# Patient Record
Sex: FEMALE | Race: WHITE | Employment: OTHER | URBAN - METROPOLITAN AREA
[De-identification: names, ages, dates, MRNs, and addresses within clinical notes are randomized per-mention and may not be internally consistent; named-entity substitution may affect disease eponyms.]

---

## 2019-06-29 ENCOUNTER — APPOINTMENT (OUTPATIENT)
Dept: CT IMAGING | Age: 69
End: 2019-06-29
Attending: EMERGENCY MEDICINE
Payer: COMMERCIAL

## 2019-06-29 ENCOUNTER — HOSPITAL ENCOUNTER (EMERGENCY)
Age: 69
Discharge: HOME | End: 2019-06-29
Attending: EMERGENCY MEDICINE
Payer: COMMERCIAL

## 2019-06-29 VITALS
SYSTOLIC BLOOD PRESSURE: 127 MMHG | BODY MASS INDEX: 20.73 KG/M2 | HEART RATE: 90 BPM | HEIGHT: 63 IN | TEMPERATURE: 98 F | WEIGHT: 117 LBS | DIASTOLIC BLOOD PRESSURE: 81 MMHG | RESPIRATION RATE: 16 BRPM | OXYGEN SATURATION: 98 %

## 2019-06-29 DIAGNOSIS — S00.03XA CONTUSION OF SCALP, INITIAL ENCOUNTER: Primary | ICD-10-CM

## 2019-06-29 PROCEDURE — 99284 EMERGENCY DEPT VISIT MOD MDM: CPT

## 2019-06-29 PROCEDURE — 70450 CT HEAD/BRAIN W/O DYE: CPT

## 2019-06-29 NOTE — DISCHARGE INSTRUCTIONS
Patient Education     Contusion: Care Instructions  Your Care Instructions  Contusion is the medical term for a bruise. It is the result of a direct blow or an impact, such as a fall. Contusions are common sports injuries. Most people think of a bruise as a black-and-blue spot. This happens when small blood vessels get torn and leak blood under the skin. But bones, muscles, and organs can also get bruised. This may damage deep tissues but not cause a bruise you can see. The doctor will do a physical exam to find the location of your contusion. You may also have tests to make sure you do not have a more serious injury, such as a broken bone or nerve damage. These may include X-rays or other imaging tests like a CT scan or MRI. Deep-tissue contusions may cause pain and swelling. But if there is no serious damage, they will often get better in a few weeks with home treatment. The doctor has checked you carefully, but problems can develop later. If you notice any problems or new symptoms, get medical treatment right away. Follow-up care is a key part of your treatment and safety. Be sure to make and go to all appointments, and call your doctor if you are having problems. It's also a good idea to know your test results and keep a list of the medicines you take. How can you care for yourself at home? · Put ice or a cold pack on the sore area for 10 to 20 minutes at a time to stop swelling. Put a thin cloth between the ice pack and your skin. · Be safe with medicines. Read and follow all instructions on the label. ¨ If the doctor gave you a prescription medicine for pain, take it as prescribed. ¨ If you are not taking a prescription pain medicine, ask your doctor if you can take an over-the-counter medicine. · If you can, prop up the sore area on pillows as much as possible for the next few days. Try to keep the sore area above the level of your heart. When should you call for help?   Call your doctor now or seek immediate medical care if:  · Your pain gets worse. · You have new or worse swelling. · You have tingling, weakness, or numbness in the area near the contusion. · The area near the contusion is cold or pale. Watch closely for changes in your health, and be sure to contact your doctor if:  · You do not get better as expected. Where can you learn more? Go to TowerMetriX.be  Enter [id number] in the search box to learn more about \"Contusion: Care Instructions. \"   © 4546-4408 [employer], [employer]orporated. Care instructions adapted under license by [employer] (which disclaims liability or warranty for this information). This care instruction is for use with your licensed healthcare professional. If you have questions about a medical condition or this instruction, always ask your healthcare professional. [address] any warranty or liability for your use of this information. Content Version: 95.9.575550; Current as of: May 22, 2015           Patient Education        Head Injury: Care Instructions  Your Care Instructions    Most injuries to the head are minor. Bumps, cuts, and scrapes on the head and face usually heal well and can be treated the same as injuries to other parts of the body. Although it's rare, once in a while a more serious problem shows up after you are home. So it's good to be on the lookout for symptoms for a day or two. Follow-up care is a key part of your treatment and safety. Be sure to make and go to all appointments, and call your doctor if you are having problems. It's also a good idea to know your test results and keep a list of the medicines you take. How can you care for yourself at home? · Follow your doctor's instructions. He or she will tell you if you need someone to watch you closely for the next 24 hours or longer. · Take it easy for the next few days or more if you are not feeling well.   · Ask your doctor when it's okay for you to go back to activities like driving a car, riding a bike, or operating machinery. When should you call for help? Call 911 anytime you think you may need emergency care. For example, call if:    · You have a seizure.     · You passed out (lost consciousness).     · You are confused or can't stay awake.    Call your doctor now or seek immediate medical care if:    · You have new or worse vomiting.     · You feel less alert.     · You have new weakness or numbness in any part of your body.    Watch closely for changes in your health, and be sure to contact your doctor if:    · You do not get better as expected.     · You have new symptoms, such as headaches, trouble concentrating, or changes in mood. Where can you learn more? Go to http://hemanth-julian.info/. Enter C337 in the search box to learn more about \"Head Injury: Care Instructions. \"  Current as of: Rekha 3, 2018  Content Version: 11.9  © 1319-3665 [employer]. Care instructions adapted under license by [employer] (which disclaims liability or warranty for this information). If you have questions about a medical condition or this instruction, always ask your healthcare professional. [address] any warranty or liability for your use of this information.

## 2019-06-29 NOTE — ED NOTES
I have reviewed discharge instructions with the spouse. The spouse verbalized understanding. Assisted patient to wheelchair and out of care area.   driving patient back to nursing home

## 2019-06-29 NOTE — ED PROVIDER NOTES
EMERGENCY DEPARTMENT HISTORY AND PHYSICAL EXAM    11:19 AM      Date: 6/29/2019  Patient Name: Eunice Sargent    History of Presenting Illness     Chief Complaint   Patient presents with    Fall         History Provided By: patient    Additional History (Context): Eunice Sargent is a 68 y.o. female presents with from memory unit with advanced Alzheimer's, the patient suffered a fall and has an abrasion to her right forehead    No other complaints of pain or discomfort  History and review of systems limited by patient's very advanced dementia. Celina Mackey PCP: Lilliana Heredia MD    Chief Complaint:   Duration:    Timing:    Location:   Quality:   Severity:   Modifying Factors:   Associated Symptoms:           Past History     Past Medical History:  Past Medical History:   Diagnosis Date    Dementia     Psychiatric disorder        Past Surgical History:  History reviewed. No pertinent surgical history. Family History:  History reviewed. No pertinent family history. Social History:  Social History     Tobacco Use    Smoking status: Never Smoker   Substance Use Topics    Alcohol use: Not Currently    Drug use: Not Currently       Allergies:  No Known Allergies      Review of Systems     Review of Systems   Constitutional: Negative for fever. Gastrointestinal: Negative for diarrhea and vomiting. Physical Exam       Patient Vitals for the past 12 hrs:   Temp Pulse Resp BP SpO2   06/29/19 1115    127/81 98 %   06/29/19 1100    167/88 95 %   06/29/19 1051 98 °F (36.7 °C) 90 16 122/73 100 %       Physical Exam   Constitutional: She appears well-developed and well-nourished. Purposefully moving about in the bed, adjusting her sheets. No acute distress. HENT:   Head: Normocephalic. 1 cm diameter abrasion to the right forehead. No palpable skull deformity. Eyes: Conjunctivae are normal. No scleral icterus. Neck: Normal range of motion. Neck supple. No JVD present.    Cardiovascular: Normal rate, regular rhythm and normal heart sounds. 4 intact extremity pulses   Pulmonary/Chest: Effort normal and breath sounds normal.   Abdominal: Soft. She exhibits no mass. There is no tenderness. Musculoskeletal: Normal range of motion. She exhibits no tenderness. No tenderness of CTL vertebra   Lymphadenopathy:     She has no cervical adenopathy. Neurological: She is alert. She has normal strength. No cranial nerve deficit or sensory deficit. Skin: Skin is warm and dry. Nursing note and vitals reviewed. Diagnostic Study Results   Labs -  No results found for this or any previous visit (from the past 12 hour(s)). Radiologic Studies -   CT HEAD WO CONT   Final Result   IMPRESSION:      1. No CT findings of an acute intracranial abnormality. Please note that   noncontrast head CT may be normal in early acute infarct. 2. Mild left periorbital edema/hematoma. Ct Head Wo Cont    Result Date: 6/29/2019  EXAM: CT Head INDICATION: Unwitnessed ground-level fall abrasions to forehead and above left eye. COMPARISON: None. TECHNIQUE: Axial CT imaging of the head was performed without intravenous contrast. One or more dose reduction techniques were used on this CT: automated exposure control, adjustment of the mAs and/or kVp according to patient size, and iterative reconstruction techniques. The specific techniques used on this CT exam have been documented in the patient's electronic medical record. Digital Imaging and Communications in Medicine (DICOM) format image data are available to nonaffiliated external healthcare facilities or entities on a secure, media free, reciprocally searchable basis with patient authorization for at least a 12-month period after this study. _______________ FINDINGS: BRAIN:   > Brain volume: Moderate diffuse volume loss with accentuated sulcal gyral pattern and sylvian fissures. > White matter: Mild white matter disease. > Infarcts, encephalomalacia: None.    > Parenchymal mass: None.   > Parenchymal hemorrhage: None.   > Midline shift: None.   > Miscellaneous: None. EXTRA-AXIAL SPACES: Unremarkable. No fluid collections. CALVARIUM: Intact. SINUSES, MASTOIDS: Clear. OTHER EXTRACRANIAL: Mild left periorbital edema/hematoma. . _______________     IMPRESSION: 1. No CT findings of an acute intracranial abnormality. Please note that noncontrast head CT may be normal in early acute infarct. 2. Mild left periorbital edema/hematoma. Medications ordered:   Medications - No data to display      Medical Decision Making   Initial Medical Decision Making and DDx: We will get CT scan of the head to rule out intracranial lesion. At this time do not suspect spinal injury, upper or lower extremity injury no hip injury. ED Course: Progress Notes, Reevaluation, and Consults:     12:47 PM CT results reviewed, no alarming findings. Will have her ambulate to ensure her extremities are working well and likely discharge. I am the first provider for this patient. I reviewed the vital signs, available nursing notes, past medical history, past surgical history, family history and social history. Patient Vitals for the past 12 hrs:   Temp Pulse Resp BP SpO2   06/29/19 1115    127/81 98 %   06/29/19 1100    167/88 95 %   06/29/19 1051 98 °F (36.7 °C) 90 16 122/73 100 %       Vital Signs-Reviewed the patient's vital signs. Pulse Oximetry Analysis, Cardiac Monitor, 12 lead ekg:     Interpreted by the EP. Records Reviewed: Nursing notes reviewed (Time of Review: 11:19 AM)    Procedures:   Critical Care Time:   Aspirin: (was aspirin given for stroke?)    Diagnosis     Clinical Impression:   1.  Contusion of scalp, initial encounter        Disposition: Discharged      Follow-up Information     Follow up With Specialties Details Why Contact Info    Miguel Samuel MD Internal Medicine In 2 days  [address]  [address] [facility] [id number] Patient's Medications    No medications on file     _______________________________    Notes:    Yoanna Woodruff, MD using Dragon dictation      _______________________________

## 2019-06-29 NOTE — ED TRIAGE NOTES
Patient arrived via EMS. States patient experienced unwitnessed ground level fall. Patient has abrasion to forehead above left eye.

## 2020-01-19 ENCOUNTER — APPOINTMENT (OUTPATIENT)
Dept: CT IMAGING | Age: 70
End: 2020-01-19
Attending: EMERGENCY MEDICINE
Payer: COMMERCIAL

## 2020-01-19 ENCOUNTER — HOSPITAL ENCOUNTER (EMERGENCY)
Dept: GENERAL RADIOLOGY | Age: 70
Discharge: HOME | End: 2020-01-19
Attending: EMERGENCY MEDICINE
Payer: COMMERCIAL

## 2020-01-19 ENCOUNTER — HOSPITAL ENCOUNTER (EMERGENCY)
Age: 70
Discharge: HOME | End: 2020-01-19
Attending: EMERGENCY MEDICINE | Admitting: EMERGENCY MEDICINE
Payer: COMMERCIAL

## 2020-01-19 VITALS
HEART RATE: 66 BPM | OXYGEN SATURATION: 96 % | TEMPERATURE: 98.2 F | RESPIRATION RATE: 14 BRPM | SYSTOLIC BLOOD PRESSURE: 116 MMHG | DIASTOLIC BLOOD PRESSURE: 73 MMHG

## 2020-01-19 DIAGNOSIS — S80.00XA CONTUSION OF KNEE, UNSPECIFIED LATERALITY, INITIAL ENCOUNTER: ICD-10-CM

## 2020-01-19 DIAGNOSIS — S00.83XA FACIAL CONTUSION, INITIAL ENCOUNTER: ICD-10-CM
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
## 2020-01-19 DIAGNOSIS — T14.8XXA ABRASION: ICD-10-CM

## 2020-01-19 DIAGNOSIS — S63.501A RIGHT WRIST SPRAIN, INITIAL ENCOUNTER: ICD-10-CM

## 2020-01-19 PROCEDURE — 90471 IMMUNIZATION ADMIN: CPT

## 2020-01-19 PROCEDURE — 90715 TDAP VACCINE 7 YRS/> IM: CPT | Performed by: EMERGENCY MEDICINE

## 2020-01-19 PROCEDURE — 74011000250 HC RX REV CODE- 250: Performed by: EMERGENCY MEDICINE

## 2020-01-19 PROCEDURE — 70450 CT HEAD/BRAIN W/O DYE: CPT

## 2020-01-19 PROCEDURE — 73110 X-RAY EXAM OF WRIST: CPT

## 2020-01-19 PROCEDURE — 74011250637 HC RX REV CODE- 250/637: Performed by: EMERGENCY MEDICINE

## 2020-01-19 PROCEDURE — 72125 CT NECK SPINE W/O DYE: CPT

## 2020-01-19 PROCEDURE — 99285 EMERGENCY DEPT VISIT HI MDM: CPT

## 2020-01-19 PROCEDURE — 73560 X-RAY EXAM OF KNEE 1 OR 2: CPT

## 2020-01-19 PROCEDURE — 70486 CT MAXILLOFACIAL W/O DYE: CPT

## 2020-01-19 PROCEDURE — 74011250636 HC RX REV CODE- 250/636: Performed by: EMERGENCY MEDICINE

## 2020-01-19 RX ORDER — BACITRACIN 500 UNIT/G
1 PACKET (EA) TOPICAL
Status: COMPLETED | OUTPATIENT
Start: 2020-01-19 | End: 2020-01-19

## 2020-01-19 RX ORDER — ACETAMINOPHEN 500 MG
1000 TABLET ORAL
Status: COMPLETED | OUTPATIENT
Start: 2020-01-19 | End: 2020-01-19

## 2020-01-19 RX ADMIN — BACITRACIN 1 PACKET: 500 OINTMENT TOPICAL at 19:48

## 2020-01-19 RX ADMIN — TETANUS TOXOID, REDUCED DIPHTHERIA TOXOID AND ACELLULAR PERTUSSIS VACCINE, ADSORBED 0.5 ML: 5; 2.5; 8; 8; 2.5 SUSPENSION INTRAMUSCULAR at 18:21

## 2020-01-19 RX ADMIN — ACETAMINOPHEN 1000 MG: 500 TABLET, FILM COATED ORAL at 19:43

## 2020-01-19 NOTE — ED PROVIDER NOTES
EMERGENCY DEPARTMENT HISTORY AND PHYSICAL EXAM    Date: 1/19/2020  Patient Name: Leopold Push    History of Presenting Illness     Chief Complaint   Patient presents with    Fall    Abrasion         History Provided By: Patient and Patient's       Additional History (Context): Leopold Push is a 68 y.o. female with dementia who presents with having face planted onto a a fellow resident's wheelchair today. Nursing home staff called  for mechanical trip and fall. She scraped her right wrist and is complaining also bilateral knee pain. Denies anticoagulation by her . There was no LOC reported. PCP: Valjean Nyhan, MD        Past History     Past Medical History:  Past Medical History:   Diagnosis Date    Dementia [facility])     Psychiatric disorder        Past Surgical History:  History reviewed. No pertinent surgical history. Family History:  History reviewed. No pertinent family history. Social History:  Social History     Tobacco Use    Smoking status: Never Smoker   Substance Use Topics    Alcohol use: Not Currently    Drug use: Not Currently       Allergies:  No Known Allergies      Review of Systems   Review of Systems   HENT: Negative for facial swelling. Musculoskeletal: Positive for arthralgias. Skin: Positive for wound. Neurological: Negative for syncope, weakness and numbness. All Other Systems Negative  Physical Exam     Vitals:    01/19/20 1724 01/19/20 1813 01/19/20 1827   BP:  124/61    Pulse: 76 75    Resp: 12 13    Temp:   98.2 °F (36.8 °C)   SpO2: 96%       Physical Exam  Vitals signs and nursing note reviewed. Constitutional:       Appearance: She is well-developed. HENT:      Head: Normocephalic. Nose:      Comments: Right epistaxis no longer bleeding. Eyes:      Pupils: Pupils are equal, round, and reactive to light. Comments: No orbital ridge drop-off appreciated. No entrapment. Neck:      Musculoskeletal: No muscular tenderness. Thyroid: No thyromegaly. Vascular: No JVD. Trachea: No tracheal deviation. Cardiovascular:      Rate and Rhythm: Normal rate and regular rhythm. Heart sounds: Normal heart sounds. No murmur. No friction rub. No gallop. Pulmonary:      Effort: Pulmonary effort is normal. No respiratory distress. Breath sounds: Normal breath sounds. No stridor. No wheezing or rales. Chest:      Chest wall: No tenderness. Abdominal:      General: There is no distension. Palpations: Abdomen is soft. There is no mass. Tenderness: There is no tenderness. There is no guarding or rebound. Musculoskeletal:         General: Tenderness and signs of injury present. Comments: Bilateral knee contusions. On the left knee, there is tenderness to the medial anterior joint line surface. There is a visible contusion. No varus valgus stressors or effusion. DP PT pulses palpable. Negative Isabel's. Right knee. There is pain with full extension of the knee. Visible contusion. DP PT pulse palpable. Pain stopped secondary to patient discomfort. Right wrist: 2 abrasions to the distal forearm on the medial margin. Each size approximately 1 cm in length. Radial pulse palpable. Full range of motion of wrist and nontender to palpation. Lymphadenopathy:      Cervical: No cervical adenopathy. Skin:     General: Skin is warm and dry. Coloration: Skin is not pale. Findings: No erythema or rash. Neurological:      Mental Status: She is alert and oriented to person, place, and time. Mental status is at baseline. Psychiatric:         Behavior: Behavior normal.         Thought Content: Thought content normal.          Diagnostic Study Results     Labs -   No results found for this or any previous visit (from the past 12 hour(s)).     Radiologic Studies -   CT HEAD WO CONT    (Results Pending)   CT MAXILLOFACIAL WO CONT    (Results Pending)   XR WRIST RT AP/LAT/OBL MIN 3V    (Results Pending)   CT SPINE CERV WO CONT    (Results Pending)   XR KNEE RT MAX 2 VWS    (Results Pending)   XR KNEE LT MAX 2 VWS    (Results Pending)     CT Results  (Last 48 hours)    None        CXR Results  (Last 48 hours)    None            Medical Decision Making   I am the first provider for this patient. I reviewed the vital signs, available nursing notes, past medical history, past surgical history, family history and social history. Vital Signs-Reviewed the patient's vital signs. Procedures:  Procedures    Provider Notes (Medical Decision Making): CT scans of face and head show no acute process. For her CT spine there is a grade 1 anterolisthesis of C4 on C5 and degenerative changes C5 through C7. But there are no acute fractures. X-ray show nothing acute of her bilateral knees or wrist.  Give her Tylenol before departure and she can be returned to the [facility]. Ace bandage applied by nurse to right wrist; excellent position. N/v intact before and after application. MED RECONCILIATION:  Current Facility-Administered Medications   Medication Dose Route Frequency    acetaminophen (TYLENOL) tablet 1,000 mg  1,000 mg Oral NOW    bacitracin 500 unit/gram packet 1 Packet  1 Packet Topical NOW     No current outpatient medications on file. Disposition:  home    DISCHARGE NOTE:   7:19 PM    Pt has been reexamined. Patient has no new complaints, changes, or physical findings. Care plan outlined and precautions discussed. Results of x-rays, CTs were reviewed with the patient. All medications were reviewed with the patient; will d/c home with tylenol. All of pt's questions and concerns were addressed. Patient was instructed and agrees to follow up with PCP, as well as to return to the ED upon further deterioration. Patient is ready to go home.     Follow-up Information     Follow up With Specialties Details Why Contact Info    Aj Prater MD Internal Medicine Schedule an appointment as soon as possible for a visit in 2 days  [address] [address] [address] [facility] EMERGENCY DEPT Emergency Medicine  If symptoms worsen return immediately [address]  [phone]          There are no discharge medications for this patient. Diagnosis     Clinical Impression:   1. Fall, initial encounter    2. Facial contusion, initial encounter    3. Contusion of knee, unspecified laterality, initial encounter    4. Right wrist sprain, initial encounter    5.  Abrasion

## 2020-01-19 NOTE — ED TRIAGE NOTES
Pt is a resident of the [facility]. Was seen by another resident, tripped and fell striking her nose and forearm on a wheelchair. No LOC.

## 2020-01-20 NOTE — ED NOTES
8:04 PM  01/19/20     Discharge instructions given to spouse (name) with verbalization of understanding. Patient accompanied by spouse, family member. Patient discharged with the following prescriptions none. Patient discharged to home (destination).       Mark Saleh RN

## 2020-01-20 NOTE — DISCHARGE INSTRUCTIONS
Patient Education        Bruises: Care Instructions  Your Care Instructions    Bruises occur when small blood vessels under the skin tear or rupture, most often from a twist, bump, or fall. Blood leaks into tissues under the skin and causes a black-and-blue spot that often turns colors, including purplish black, reddish blue, or yellowish green, as the bruise heals. Bruises hurt, but most are not serious and will go away on their own within 2 to 4 weeks. Sometimes, gravity causes them to spread down the body. A leg bruise usually will take longer to heal than a bruise on the face or arms. Follow-up care is a key part of your treatment and safety. Be sure to make and go to all appointments, and call your doctor if you are having problems. It's also a good idea to know your test results and keep a list of the medicines you take. How can you care for yourself at home? · Take pain medicines exactly as directed. ? If the doctor gave you a prescription medicine for pain, take it as prescribed. ? If you are not taking a prescription pain medicine, ask your doctor if you can take an over-the-counter medicine. · Put ice or a cold pack on the area for 10 to 20 minutes at a time. Put a thin cloth between the ice and your skin. · If you can, prop up the bruised area on pillows as much as possible for the next few days. Try to keep the bruise above the level of your heart. When should you call for help? Call your doctor now or seek immediate medical care if:    · You have signs of infection, such as:  ? Increased pain, swelling, warmth, or redness. ? Red streaks leading from the bruise. ? Pus draining from the bruise. ? A fever.     · You have a bruise on your leg and signs of a blood clot, such as:  ? Increasing redness and swelling along with warmth, tenderness, and pain in the bruised area. ? Pain in your calf, back of the knee, thigh, or groin. ?  Redness and swelling in your leg or groin.     · Your pain gets worse.    Watch closely for changes in your health, and be sure to contact your doctor if:    · You do not get better as expected. Where can you learn more? Go to http://hemanth-julian.info/. Enter [phone] in the search box to learn more about \"Bruises: Care Instructions. \"  Current as of: June 26, 2019  Content Version: 12.2  © 5276-1861 [employer]. Care instructions adapted under license by [employer] (which disclaims liability or warranty for this information). If you have questions about a medical condition or this instruction, always ask your healthcare professional. [place] [address] any warranty or liability for your use of this information. Patient Education        Head Injury: Care Instructions  Your Care Instructions    Most injuries to the head are minor. Bumps, cuts, and scrapes on the head and face usually heal well and can be treated the same as injuries to other parts of the body. Although it's rare, once in a while a more serious problem shows up after you are home. So it's good to be on the lookout for symptoms for a day or two. Follow-up care is a key part of your treatment and safety. Be sure to make and go to all appointments, and call your doctor if you are having problems. It's also a good idea to know your test results and keep a list of the medicines you take. How can you care for yourself at home? · Follow your doctor's instructions. He or she will tell you if you need someone to watch you closely for the next 24 hours or longer. · Take it easy for the next few days or more if you are not feeling well. · Ask your doctor when it's okay for you to go back to activities like driving a car, riding a bike, or operating machinery. When should you call for help? Call 911 anytime you think you may need emergency care.  For example, call if:    · You have a seizure.     · You passed out (lost consciousness).     · You are confused or can't stay awake.    Call your doctor now or seek immediate medical care if:    · You have new or worse vomiting.     · You feel less alert.     · You have new weakness or numbness in any part of your body.    Watch closely for changes in your health, and be sure to contact your doctor if:    · You do not get better as expected.     · You have new symptoms, such as headaches, trouble concentrating, or changes in mood. Where can you learn more? Go to http://hemanth-julian.info/. Enter T691 in the search box to learn more about \"Head Injury: Care Instructions. \"  Current as of: March 28, 2019  Content Version: 12.2  © 1229-6513 "[employer]". Care instructions adapted under license by [employer] (which disclaims liability or warranty for this information). If you have questions about a medical condition or this instruction, always ask your healthcare professional. [place] [address] any warranty or liability for your use of this information. Patient Education        Hand Sprain: Care Instructions  Your Care Instructions  A hand sprain occurs when you stretch or tear a ligament in your hand. Ligaments are the tough tissues that connect one bone to another. Most hand sprains will heal with treatment you can do at home. Follow-up care is a key part of your treatment and safety. Be sure to make and go to all appointments, and call your doctor if you are having problems. It's also a good idea to know your test results and keep a list of the medicines you take. How can you care for yourself at home? · If your doctor gave you a splint or immobilizer, wear it as directed. This will help keep swelling down and help your hand heal.  · Follow your doctor's directions for exercise and other activity. · For the first 2 days after your injury, avoid things that might increase swelling, such as hot showers, hot tubs, or hot packs.   · Put ice or a cold pack on your hand for 10 to 20 minutes at a time to stop swelling. Try this every 1 to 2 hours for 3 days (when you are awake) or until the swelling goes down. Put a thin cloth between the ice pack and your skin. Keep your splint dry. · After 2 or 3 days, if your swelling is gone, put a heating pad (set on low) or a warm cloth on your hand. Some experts suggest that you go back and forth between hot and cold treatments. · Prop up your hand on a pillow when you ice it or anytime you sit or lie down. Try to keep it above the level of your heart. This will help reduce swelling. · Take pain medicines exactly as directed. ? If the doctor gave you a prescription medicine for pain, take it as prescribed. ? If you are not taking a prescription pain medicine, ask your doctor if you can take an over-the-counter medicine. · Return to your usual level of activity slowly. When should you call for help? Call your doctor now or seek immediate medical care if:    · Your pain is worse.     · You have new or increased swelling in your hand.     · You cannot move your hand.     · You have tingling, weakness, or numbness in your hand or fingers.     · Your hand or fingers are cool or pale or change color.     · You have a fever.     · Your hand or fingers are red.    Watch closely for changes in your health, and be sure to contact your doctor if:    · Your hand does not get better as expected. Where can you learn more? Go to http://hemanth-julian.info/. Enter Z784 in the search box to learn more about \"Hand Sprain: Care Instructions. \"  Current as of: June 26, 2019  Content Version: 12.2  © 8080-6180 [employer]. Care instructions adapted under license by [facility] (which disclaims liability or warranty for this information).  If you have questions about a medical condition or this instruction, always ask your healthcare professional. Mark Antunez any warranty or liability for your use of this information.

## 2020-04-23 ENCOUNTER — APPOINTMENT (OUTPATIENT)
Dept: CT IMAGING | Age: 70
End: 2020-04-23
Attending: EMERGENCY MEDICINE
Payer: COMMERCIAL

## 2020-04-23 ENCOUNTER — HOSPITAL ENCOUNTER (EMERGENCY)
Age: 70
Discharge: HOME | End: 2020-04-23
Attending: EMERGENCY MEDICINE
Payer: COMMERCIAL

## 2020-04-23 VITALS
WEIGHT: 124 LBS | DIASTOLIC BLOOD PRESSURE: 67 MMHG | SYSTOLIC BLOOD PRESSURE: 132 MMHG | TEMPERATURE: 97.5 F | OXYGEN SATURATION: 99 % | HEART RATE: 71 BPM | RESPIRATION RATE: 16 BRPM | BODY MASS INDEX: 19.93 KG/M2 | HEIGHT: 66 IN

## 2020-04-23 DIAGNOSIS — S09.90XA INJURY OF HEAD, INITIAL ENCOUNTER: Primary | ICD-10-CM

## 2020-04-23 PROCEDURE — 99284 EMERGENCY DEPT VISIT MOD MDM: CPT

## 2020-04-23 PROCEDURE — 70450 CT HEAD/BRAIN W/O DYE: CPT

## 2020-04-23 PROCEDURE — 75810000293 HC SIMP/SUPERF WND  RPR

## 2020-04-23 PROCEDURE — 72125 CT NECK SPINE W/O DYE: CPT

## 2020-04-23 PROCEDURE — 74011000250 HC RX REV CODE- 250: Performed by: EMERGENCY MEDICINE

## 2020-04-23 RX ORDER — TRAZODONE HYDROCHLORIDE 50 MG/1
TABLET ORAL
COMMUNITY

## 2020-04-23 RX ORDER — ACETAMINOPHEN 325 MG/1
325 TABLET ORAL
COMMUNITY

## 2020-04-23 RX ORDER — METRONIDAZOLE 7.5 MG/G
1 GEL TOPICAL 2 TIMES DAILY
COMMUNITY

## 2020-04-23 RX ORDER — RIVASTIGMINE 9.5 MG/24H
1 PATCH, EXTENDED RELEASE TRANSDERMAL DAILY
COMMUNITY

## 2020-04-23 RX ORDER — OLANZAPINE 2.5 MG/1
2.5 TABLET ORAL 2 TIMES DAILY
COMMUNITY

## 2020-04-23 RX ORDER — LANOLIN ALCOHOL/MO/W.PET/CERES
3 CREAM (GRAM) TOPICAL
COMMUNITY

## 2020-04-23 RX ORDER — LIDOCAINE AND PRILOCAINE 25; 25 MG/G; MG/G
CREAM TOPICAL AS NEEDED
Status: DISCONTINUED | OUTPATIENT
Start: 2020-04-23 | End: 2020-04-23 | Stop reason: HOSPADM

## 2020-04-23 RX ORDER — OLANZAPINE 10 MG/1
10 TABLET ORAL
COMMUNITY

## 2020-04-23 RX ORDER — MEMANTINE HYDROCHLORIDE 10 MG/1
10 TABLET ORAL DAILY
COMMUNITY

## 2020-04-23 RX ADMIN — LIDOCAINE AND PRILOCAINE: 25; 25 CREAM TOPICAL at 09:13

## 2020-04-23 NOTE — ED NOTES
Spoke to  Giuseppe Paiztrixie who stated he regularly transports her from hospital and is coming to pick her up now.

## 2020-04-23 NOTE — ED PROVIDER NOTES
EMERGENCY DEPARTMENT HISTORY AND PHYSICAL EXAM    8:50 AM      Date: 4/23/2020  Patient Name: Miguel Cuba    History of Presenting Illness     Chief Complaint   Patient presents with    Fall    Laceration         History Provided By: patient    Additional History (Context): Miguel Cuba is a 65 y.o. female presents with with progressed dementia, had a fall at the nursing home, sustained a laceration to the back of her head. From the electronic medical record her tetanus is up-to-date as of January of this year. She is not complaining of any pain or discomfort and is at her mental baseline per staff from the facility. History and review of systems complicated by nonverbal status due to dementia. PCP: Amada Mei MD    Chief Complaint:   Duration:    Timing:    Location:   Quality:   Severity:   Modifying Factors:   Associated Symptoms:           Past History     Past Medical History:  Past Medical History:   Diagnosis Date    Dementia ([facility] [address]     Psychiatric disorder        Past Surgical History:  No past surgical history on file. Family History:  No family history on file. Social History:  Social History     Tobacco Use    Smoking status: Never Smoker   Substance Use Topics    Alcohol use: Not Currently    Drug use: Not Currently       Allergies:  No Known Allergies      Review of Systems     Review of Systems      Physical Exam       Patient Vitals for the past 12 hrs:   Temp Pulse Resp BP SpO2   04/23/20 0900    142/75 99 %   04/23/20 0857 97.5 °F (36.4 °C) 71 16 145/81 99 %       Physical Exam  Vitals signs and nursing note reviewed. Constitutional:       General: She is not in acute distress. Appearance: She is well-developed. She is not ill-appearing or toxic-appearing. HENT:      Head: Normocephalic. Comments: 1 inch diameter area of swelling with a 2 cm laceration on the right occiput without palpable skull skull deformity  Eyes:      General: No scleral icterus. Extraocular Movements: Extraocular movements intact. Conjunctiva/sclera: Conjunctivae normal.      Pupils: Pupils are equal, round, and reactive to light. Neck:      Musculoskeletal: Normal range of motion and neck supple. No muscular tenderness. Vascular: No JVD. Cardiovascular:      Rate and Rhythm: Normal rate and regular rhythm. Pulmonary:      Effort: Pulmonary effort is normal. No respiratory distress. Musculoskeletal: Normal range of motion. Skin:     General: Skin is warm and dry. Neurological:      General: No focal deficit present. Mental Status: She is alert. Mental status is at baseline. Psychiatric:         Thought Content: Thought content normal.         Judgment: Judgment normal.             Diagnostic Study Results   Labs -  No results found for this or any previous visit (from the past 12 hour(s)). Radiologic Studies -   CT SPINE CERV WO CONT   Final Result   IMPRESSION:      1. No acute fracture or subluxation of the cervical spine. 2.  Multilevel degenerative changes, as described. Please note that this cervical spine CT was performed supine and does not   evaluate for ligamentous injury or instability. If the patient has persistent   symptoms or if otherwise clinically indicated, erect cervical spine plain films   are recommended. CT HEAD WO CONT   Final Result   IMPRESSION:      1. No CT evidence of an acute intracranial abnormality or other findings   related to recent trauma. 2.  Moderate cerebral atrophy/volume loss and periventricular white matter   changes, most commonly seen with chronic microvascular disease. Ct Head Wo Cont    Result Date: 4/23/2020  EXAM: CT HEAD, WITHOUT IV CONTRAST INDICATION: Recent fall with head pain and swelling COMPARISON: 1/19/2020 TECHNIQUE: Multiple axial CT images of the head were obtained extending from the skull base through the vertex.  Additional coronal and sagittal reformations were also performed. One or more dose reduction techniques were used on this CT: automated exposure control, adjustment of the mAs and/or kVp according to patient size, and iterative reconstruction techniques. The specific techniques used on this CT exam have been documented in the patient's electronic medical record. Digital Imaging and Communications in Medicine (DICOM) format image data are available to nonaffiliated external healthcare facilities or entities on a secure, media free, reciprocally searchable basis with patient authorization for at least a 12-month period after this study. _______________ FINDINGS: BRAIN AND POSTERIOR FOSSA: There is generalized prominence of the ventricular system, associated with proportional widening of the cortical cerebral sulci, compatible with generalized volume loss. Hazy hypoattenuation identified along the periventricular white matter, a nonspecific finding which is most commonly encountered in the setting of chronic microvascular disease. There is no intracranial hemorrhage, mass effect, or midline shift. There are no significant additional areas of abnormal parenchymal attenuation. EXTRA-AXIAL SPACES AND MENINGES: There are no abnormal extra-axial fluid collections. CALVARIUM: No acute osseous abnormality. OTHER: The visualized portions of the paranasal sinuses and mastoid air cells are clear. _______________     IMPRESSION: 1. No CT evidence of an acute intracranial abnormality or other findings related to recent trauma. 2.  Moderate cerebral atrophy/volume loss and periventricular white matter changes, most commonly seen with chronic microvascular disease. Ct Spine Cerv Wo Cont    Result Date: 4/23/2020  EXAM: CT SPINE CERV WO CONT CLINICAL INDICATION/HISTORY: Recent fall with neck pain COMPARISON: 1/19/2020 TECHNIQUE: CT of the cervical spine without intravenous contrast administration. Coronal and sagittal reformats were generated and reviewed.   One or more dose reduction techniques were used on this CT: automated exposure control, adjustment of the mAs and/or kVp according to patient size, and iterative reconstruction techniques. The specific techniques used on this CT exam have been documented in the patient's electronic medical record. Digital Imaging and Communications in Medicine (DICOM) format image data are available to nonaffiliated external healthcare facilities or entities on a secure, media free, reciprocally searchable basis with patient authorization for at least a 12-month period after this study. _______________ FINDINGS: VERTEBRAE AND DISCS: There is overall straightening of the normal cervical lordosis. C4-C5 anterolisthesis measures 3 mm. No additional listhesis elsewhere. .  Multilevel degenerative disc space height loss and discogenic endplate spurring, most prominent C5-C7. Vertebral body heights are preserved. Moderate degenerative changes of the atlanto-axial articulation. The posterior elements are intact. No fracture or subluxation. Coronal reformations show normal alignment of articular pillars. There are no significant areas of bone lucency or sclerosis. SPINAL CANAL AND FORAMINA: No high-grade canal stenosis. Degenerative facet arthropathy and uncovertebral proliferative changes with associated foraminal stenoses: Myeloma left at C3-C4, moderately severe on the right at C4-C5, moderately severe on the left and moderate on the right at C5-C6, moderate on the right at C6-C7. PREVERTEBRAL SOFT TISSUES: Normal. VISIBLE PORTIONS OF POSTERIOR FOSSA/BRAIN: Normal. LUNG APICES: Clear. OTHER: None. _______________     IMPRESSION: 1. No acute fracture or subluxation of the cervical spine. 2.  Multilevel degenerative changes, as described. Please note that this cervical spine CT was performed supine and does not evaluate for ligamentous injury or instability.   If the patient has persistent symptoms or if otherwise clinically indicated, erect cervical spine plain films are recommended. Medications ordered:   Medications   lidocaine-prilocaine (EMLA) 2.5-2.5 % cream ( Topical Given 4/23/20 0913)         Medical Decision Making   Initial Medical Decision Making and DDx:  Tetanus is up-to-date, will image head and cervical spine for cervical spine fracture or skull fracture or intracranial injury. Do not think labs will change the course of care. Will repair the laceration. ED Course: Progress Notes, Reevaluation, and Consults:     10:34 AM Pt reevaluated at this time. Discussed results and findings, as well as, diagnosis and plan for discharge. Follow up with doctors/services listed. Return to the emergency department for alarming symptoms. Pt verbalizes understanding and agreement with plan. All questions addressed. Laceration repaired, no intracranial or spinal injury    Wound Repair  Date/Time: 4/23/2020 10:34 AM  Performed by: attendingProcedure prep: Wound cleaned with sterile water. Location details: scalp  Wound length:2.5 cm or less    Anesthesia:  Local Anesthetic: lidocaine/prilocaine emulsion  Skin closure: staples  Number of sutures: 2  Technique: simple  Approximation: loose  Patient tolerance: Patient tolerated the procedure well with no immediate complications  My total time at bedside, performing this procedure was 1-15 minutes. I am the first provider for this patient. I reviewed the vital signs, available nursing notes, past medical history, past surgical history, family history and social history. Patient Vitals for the past 12 hrs:   Temp Pulse Resp BP SpO2   04/23/20 0900    142/75 99 %   04/23/20 0857 97.5 °F (36.4 °C) 71 16 145/81 99 %       Vital Signs-Reviewed the patient's vital signs. Pulse Oximetry Analysis, Cardiac Monitor, 12 lead ekg:      Interpreted by the EP.       Records Reviewed: Nursing notes reviewed (Time of Review: 8:50 AM)    Procedures:   Critical Care Time:   Aspirin: (was aspirin given for stroke?)    Diagnosis     Clinical Impression:   1. Injury of head, initial encounter        Disposition: Discharged      Follow-up Information     Follow up With Specialties Details Why Contact Info    Erika Carrera MD Internal Medicine In 1 week  [address] [place] [address]  [phone]             Patient's Medications   Start Taking    No medications on file   Continue Taking    ACETAMINOPHEN (TYLENOL) 325 MG TABLET    Take 325 mg by mouth every four (4) hours as needed for Pain. MELATONIN 3 MG TABLET    Take 3 mg by mouth nightly. MEMANTINE (NAMENDA) 10 MG TABLET    Take 10 mg by mouth daily. METRONIDAZOLE (METROGEL) 0.75 % TOPICAL GEL    Apply 1 g to affected area two (2) times a day. OLANZAPINE (ZYPREXA) 10 MG TABLET    Take 10 mg by mouth nightly. OLANZAPINE (ZYPREXA) 2.5 MG TABLET    Take 2.5 mg by mouth two (2) times a day. RIVASTIGMINE (EXELON) 9.5 MG/24 HR PATCH    1 Patch by TransDERmal route daily. Dose 13.3 mg/24 hr not found in system    TRAZODONE (DESYREL) 50 MG TABLET    Take  by mouth nightly.    These Medications have changed    No medications on file   Stop Taking    No medications on file     _______________________________    Notes:    Alan Valles MD using Dragon dictation      _______________________________

## 2020-04-23 NOTE — ED NOTES
Spoke to patient's  on phone, provided update, ID verified with security upon patient arrival to ED.

## 2020-04-23 NOTE — ED TRIAGE NOTES
Patient arrives from [facility] for fall witnessed by fellow patient with small laceration to back of head.

## 2020-04-23 NOTE — DISCHARGE INSTRUCTIONS
Patient Education   Laceration to scalp has 2 staples, these should be removed in 10 to 14 days. Learning About a Closed Head Injury  What is a closed head injury? A closed head injury happens when your head gets hit hard. The strong force of the blow causes your brain to shake in your skull. This movement can cause the brain to bruise, swell, or tear. Sometimes nerves or blood vessels also get damaged. This can cause bleeding in or around the brain. A concussion is a type of closed head injury. What are the symptoms? If you have a mild concussion, you may have a mild headache or feel \"not quite right. \" These symptoms are common. They usually go away over a few days to 4 weeks. But sometimes after a concussion, you feel like you can't function as well as before the injury. And you have new symptoms. This is called postconcussive syndrome. You may:  · Find it harder to solve problems, think, concentrate, or remember. · Have headaches. · Have changes in your sleep patterns, such as not being able to sleep or sleeping all the time. · Have changes in your personality. · Not be interested in your usual activities. · Feel angry or anxious without a clear reason. · Lose your sense of taste or smell. · Be dizzy, lightheaded, or unsteady. It may be hard to stand or walk. How is a closed head injury treated? Any person who may have a concussion needs to see a doctor. Some people have to stay in the hospital to be watched. Others can go home safely. If you go home, follow your doctor's instructions. He or she will tell you if you need someone to watch you closely for the next 24 hours or longer. Rest is the best treatment. Get plenty of sleep at night. And try to rest during the day. · Avoid activities that are physically or mentally demanding. These include housework, exercise, and schoolwork. And don't play video games, send text messages, or use the computer.  You may need to change your school or work schedule to be able to avoid these activities. · Ask your doctor when it's okay to drive, ride a bike, or operate machinery. · Take an over-the-counter pain medicine, such as acetaminophen (Tylenol), ibuprofen (Advil, Motrin), or naproxen (Aleve). Be safe with medicines. Read and follow all instructions on the label. · Check with your doctor before you use any other medicines for pain. · Do not drink alcohol or use illegal drugs. They can slow recovery. They can also increase your risk of getting a second head injury. Follow-up care is a key part of your treatment and safety. Be sure to make and go to all appointments, and call your doctor if you are having problems. It's also a good idea to know your test results and keep a list of the medicines you take. Where can you learn more? Go to http://hemanth-julian.info/  Enter E235 in the search box to learn more about \"Learning About a Closed Head Injury. \"  Current as of: November 19, 2019Content Version: 12.4  © 7831-3286 [employer]. Care instructions adapted under license by [employer] (which disclaims liability or warranty for this information). If you have questions about a medical condition or this instruction, always ask your healthcare professional. [address] any warranty or liability for your use of this information.

## 2020-10-18 ENCOUNTER — HOSPITAL ENCOUNTER (EMERGENCY)
Age: 70
Discharge: HOME | End: 2020-10-18
Attending: EMERGENCY MEDICINE
Payer: COMMERCIAL

## 2020-10-18 VITALS
SYSTOLIC BLOOD PRESSURE: 160 MMHG | OXYGEN SATURATION: 93 % | DIASTOLIC BLOOD PRESSURE: 72 MMHG | TEMPERATURE: 98.1 F | HEART RATE: 75 BPM | RESPIRATION RATE: 18 BRPM
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
## 2020-10-18 DIAGNOSIS — F03.C0 ADVANCED DEMENTIA: ICD-10-CM

## 2020-10-18 PROCEDURE — 99284 EMERGENCY DEPT VISIT MOD MDM: CPT

## 2020-10-18 NOTE — DISCHARGE INSTRUCTIONS
Patient Education        Dementia: Care Instructions  Your Care Instructions     Dementia is a loss of mental skills that affects your daily life. It is different than the occasional trouble with memory that is part of aging. You may find it hard to remember things that you feel you should be able to remember. Or you may feel that your mind is just not working as well as usual.  Finding out that you have dementia is a shock. You may be afraid and worried about how the condition will change your life. Although there is no cure at this time, medicine may slow memory loss and improve thinking for a while. Other medicines may be able to help you sleep or cope with depression and behavior changes. Dementia often gets worse slowly. But it can get worse quickly. As dementia gets worse, it may become harder to do common things that take planning, like making a list and going shopping. Over time, the disease may make it hard for you to take care of yourself. Some people with dementia need others to help care for them. Dementia is different for everyone. You may be able to function well for a long time. In the early stage of the condition, you can do things at home to make life easier and safer. You also can keep doing your hobbies and other activities. Many people find comfort in planning now for their future needs. Follow-up care is a key part of your treatment and safety. Be sure to make and go to all appointments, and call your doctor if you are having problems. It's also a good idea to know your test results and keep a list of the medicines you take. How can you care for yourself at home? · Take your medicines exactly as prescribed. Call your doctor if you think you are having a problem with your medicine. · Eat healthy foods. Eat lots of whole grains, fruits, and vegetables every day.  If you are not hungry, try snacks or nutritional drinks such as Boost, Ensure, or Sustacal.  · If you have problems sleeping:  ? Try not to nap too close to your bedtime. ? Exercise regularly. Walking is a good choice. ? Try a glass of warm milk or caffeine-free herbal tea before bed. · Do tasks and activities during the time of day when you feel your best. It may help to develop a daily routine. · Post labels, lists, and sticky notes to help you remember things. Write your activities on a calendar you can easily find. Put your clock where you can easily see it. · Stay active. Take walks in familiar places, or with friends or loved ones. Try to stay active mentally too. Read and work crossword puzzles if you enjoy these activities. · Do not drive unless you can pass an on-road driving test. If you are not sure if you are safe to drive, your state 's license bureau can test you. · Keep a cordless phone and a flashlight with new batteries by your bed. If possible, put a phone in each of the main rooms of your house, or carry a cell phone in case you fall and cannot reach a phone. Or, you can wear a device around your neck or wrist. You push a button that sends a signal for help. Acknowledge your emotions and plan for the future  · Talk openly and honestly with your doctor. · Let yourself grieve. It is common to feel angry, scared, frustrated, anxious, or depressed. · Get emotional support from family, friends, a support group, or a counselor experienced in working with people who have dementia. · Ask for help if you need it. · Tell your doctor how you feel. You may feel upset, angry, or worried at times. Many things can cause this, including poor sleep, medicine side effects, confusion, and pain. Your doctor may be able to help you. · Plan for the future. ? Talk to your family and doctor about preparing a living will and other important papers while you can make decisions. These papers tell your doctors how to care for you at the end of your life.   ? Consider naming a person to make decisions about your care if you are not able to. When should you call for help? Call 911 anytime you think you may need emergency care. For example, call if:    · You are lost and do not know whom to call.     · You are injured and do not know whom to call. Call your doctor now or seek immediate medical care if:    · You are more confused or upset than usual.     · You feel like you could hurt yourself because your mind is not working well. Watch closely for changes in your health, and be sure to contact your doctor if you have any problems. Where can you learn more? Go to http://www.gray.com/  Enter M476 in the search box to learn more about \"Dementia: Care Instructions. \"  Current as of: January 31, 2020               Content Version: 12.6  © 4350-3513 [employer]. Care instructions adapted under license by [employer] (which disclaims liability or warranty for this information). If you have questions about a medical condition or this instruction, always ask your healthcare professional. [address] any warranty or liability for your use of this information.

## 2020-10-18 NOTE — ED TRIAGE NOTES
Pt arrived via EMS for a reported \"ground level fall\". Per EMS patient comes from [facility] and had a reported fall from standing position. Per EMS patient has a history of dementia and only answers questions with one word which staff at [place] states this is her normal mentation. Pt denies any pains and no obvious injury noted.

## 2020-10-18 NOTE — ED PROVIDER NOTES
EMERGENCY DEPARTMENT HISTORY AND PHYSICAL EXAM    4:45 PM      Date: 10/18/2020  Patient Name: Zelalem Gordillo    History of Presenting Illness     Chief Complaint   Patient presents with    Fall         History Provided By: EMS    Additional History (Context): Zelalem Gordillo is a 65 y.o. female presents with patient with advanced dementia comes from memory care unit where she had an unwitnessed fall. Paramedics found her supine on the ground with a pillow behind her bed and staff attending to her. No obvious injury, patient not complaining of any pain. She is mostly nonverbal gives one-word answers to some questions. She is at her normal baseline. She is alert sometimes regard to her surroundings but mostly just stares ahead. History and review of systems limited by severe advanced dementia. PCP: Olga Joyce MD    Chief Complaint:   Duration:    Timing:    Location:   Quality:   Severity:   Modifying Factors:   Associated Symptoms:       Current Outpatient Medications   Medication Sig Dispense Refill    rivastigmine (EXELON) 9.5 mg/24 hr patch 1 Patch by TransDERmal route daily. Dose 13.3 mg/24 hr not found in system      acetaminophen (TYLENOL) 325 mg tablet Take 325 mg by mouth every four (4) hours as needed for Pain.  melatonin 3 mg tablet Take 3 mg by mouth nightly.  metroNIDAZOLE (METROGEL) 0.75 % topical gel Apply 1 g to affected area two (2) times a day.  memantine (NAMENDA) 10 mg tablet Take 10 mg by mouth daily.  OLANZapine (ZyPREXA) 2.5 mg tablet Take 2.5 mg by mouth two (2) times a day.  OLANZapine (ZyPREXA) 10 mg tablet Take 10 mg by mouth nightly.  traZODone (DESYREL) 50 mg tablet Take  by mouth nightly. Past History     Past Medical History:  Past Medical History:   Diagnosis Date    Dementia ([facility] [address]     Psychiatric disorder        Past Surgical History:  No past surgical history on file. Family History:  No family history on file.     Social History:  Social History     Tobacco Use    Smoking status: Never Smoker   Substance Use Topics    Alcohol use: Not Currently    Drug use: Not Currently       Allergies:  No Known Allergies      Review of Systems     Review of Systems      Physical Exam       Patient Vitals for the past 12 hrs:   Temp Pulse Resp BP SpO2   10/18/20 1652 98.1 °F (36.7 °C) 75 18 (!) 160/72 93 %       Physical Exam  Vitals signs and nursing note reviewed. Constitutional:       General: She is not in acute distress. Appearance: Normal appearance. She is well-developed and normal weight. HENT:      Head: Normocephalic and atraumatic. Eyes:      General: No scleral icterus. Extraocular Movements: Extraocular movements intact. Conjunctiva/sclera: Conjunctivae normal.   Neck:      Musculoskeletal: Normal range of motion and neck supple. Vascular: No JVD. Cardiovascular:      Rate and Rhythm: Normal rate and regular rhythm. Heart sounds: Normal heart sounds. Comments: 4 intact extremity pulses  Pulmonary:      Effort: Pulmonary effort is normal.      Breath sounds: Normal breath sounds. Abdominal:      Palpations: Abdomen is soft. There is no mass. Tenderness: There is no abdominal tenderness. Musculoskeletal: Normal range of motion. General: No tenderness. Comments: No tenderness of CT or L-spine   Lymphadenopathy:      Cervical: No cervical adenopathy. Skin:     General: Skin is warm and dry. Neurological:      General: No focal deficit present. Mental Status: She is alert. Comments: Spontaneously moves all 4 extremities. Does not follow commands. Diagnostic Study Results   Labs -  No results found for this or any previous visit (from the past 12 hour(s)). Radiologic Studies -   No orders to display     No results found.     Medications ordered:   Medications - No data to display      Medical Decision Making   Initial Medical Decision Making and DDx:  She is very advanced dementia, almost entirely nonverbal.  No apparent injury. We will get her up have her ambulate. I will contact her medical decision maker and discuss what type of evaluation we might make, in light of no apparent injury. ED Course: Progress Notes, Reevaluation, and Consults:     5:37 PM Pt reevaluated at this time. Discussed results and findings, as well as, diagnosis and plan for discharge. Follow up with doctors/services listed. Return to the emergency department for alarming symptoms. Pt verbalizes understanding and agreement with plan. All questions addressed. Patient ambulates well. Again no apparent injury or distress to further focused exam.  I called and discussed thoroughly with her  Mariano Bingham, discussed option of performing head CT however patient would likely be a poor operative candidate if there were any findings. He agrees no further testing necessary if we have not found signs of an injury to further focus things he will come and pick her up and is happy with this plan for discharge. I assured him if something comes up later today we can move forward with an evaluation from that point. I am the first provider for this patient. I reviewed the vital signs, available nursing notes, past medical history, past surgical history, family history and social history. Patient Vitals for the past 12 hrs:   Temp Pulse Resp BP SpO2   10/18/20 1652 98.1 °F (36.7 °C) 75 18 (!) 160/72 93 %       Vital Signs-Reviewed the patient's vital signs. Pulse Oximetry Analysis, Cardiac Monitor, 12 lead ekg:     Interpreted by the EP. Records Reviewed: Nursing notes reviewed (Time of Review: 4:45 PM)    Procedures:   Critical Care Time:   Aspirin: (was aspirin given for stroke?)    Diagnosis     Clinical Impression:   1. Fall, initial encounter    2.  Advanced dementia [facility])        Disposition: Discharged      Follow-up Information     Follow up With Specialties Details Why Contact Info    Darcel Opitz, MD Internal Medicine In 3 days  [address] [id number]  [phone]             Patient's Medications   Start Taking    No medications on file   Continue Taking    ACETAMINOPHEN (TYLENOL) 325 MG TABLET    Take 325 mg by mouth every four (4) hours as needed for Pain. MELATONIN 3 MG TABLET    Take 3 mg by mouth nightly. MEMANTINE (NAMENDA) 10 MG TABLET    Take 10 mg by mouth daily. METRONIDAZOLE (METROGEL) 0.75 % TOPICAL GEL    Apply 1 g to affected area two (2) times a day. OLANZAPINE (ZYPREXA) 10 MG TABLET    Take 10 mg by mouth nightly. OLANZAPINE (ZYPREXA) 2.5 MG TABLET    Take 2.5 mg by mouth two (2) times a day. RIVASTIGMINE (EXELON) 9.5 MG/24 HR PATCH    1 Patch by TransDERmal route daily. Dose 13.3 mg/24 hr not found in system    TRAZODONE (DESYREL) 50 MG TABLET    Take  by mouth nightly.    These Medications have changed    No medications on file   Stop Taking    No medications on file     _______________________________    Notes:    Maylin Jordan MD using Dragon dictation      _______________________________

## 2021-02-05 ENCOUNTER — HOSPITAL ENCOUNTER (EMERGENCY)
Age: 71
Discharge: NURSING, REHAB OR LONG-TERM CARE | End: 2021-02-05
Attending: EMERGENCY MEDICINE
Payer: COMMERCIAL

## 2021-02-05 ENCOUNTER — APPOINTMENT (OUTPATIENT)
Dept: CT IMAGING | Age: 71
End: 2021-02-05
Attending: PHYSICIAN ASSISTANT
Payer: COMMERCIAL

## 2021-02-05 VITALS
HEART RATE: 64 BPM | SYSTOLIC BLOOD PRESSURE: 106 MMHG | OXYGEN SATURATION: 94 % | RESPIRATION RATE: 13 BRPM | TEMPERATURE: 98.2 F | DIASTOLIC BLOOD PRESSURE: 58 MMHG
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
## 2021-02-05 PROCEDURE — 70450 CT HEAD/BRAIN W/O DYE: CPT

## 2021-02-05 PROCEDURE — 72125 CT NECK SPINE W/O DYE: CPT

## 2021-02-05 PROCEDURE — 99285 EMERGENCY DEPT VISIT HI MDM: CPT

## 2021-02-05 NOTE — ED TRIAGE NOTES
Pt brought in by EMS from [facility] s/p unwitnessed fall today. Patient alert and oriented to person (baseline orientation), history of dementia. Patient will answer yes/no questions occasionally but is pulling off monitor leads while being triaged. Patient has a hematoma to back of head, no active bleeding noted, C-Collar in place. Patient appears relaxed with even and unlabored respirations. VSS. Per medication list provided by EMS patient is not currently on anticoagulants

## 2021-02-05 NOTE — ED PROVIDER NOTES
EMERGENCY DEPARTMENT HISTORY AND PHYSICAL EXAM    Date: 2/5/2021  Patient Name: Feliz Heredia    History of Presenting Illness     Chief Complaint   Patient presents with    Fall         History Provided By: patient        Additional History (Context): Feliz Heredia is a 63yo F here status post unwitnessed fall prior to arrival at nursing home. Patient has a history of dementia therefore history is limited. Per nursing home staff, patient was found on the ground. Staff also confirm this is patient's baseline. Denies recent fevers, vomiting, urinary sx or signs of infection. Unknown if she hit her head     PCP: Lucendia Severin, MD    Current Outpatient Medications   Medication Sig Dispense Refill    rivastigmine (EXELON) 9.5 mg/24 hr patch 1 Patch by TransDERmal route daily. Dose 13.3 mg/24 hr not found in system      acetaminophen (TYLENOL) 325 mg tablet Take 325 mg by mouth every four (4) hours as needed for Pain.  melatonin 3 mg tablet Take 3 mg by mouth nightly.  metroNIDAZOLE (METROGEL) 0.75 % topical gel Apply 1 g to affected area two (2) times a day.  memantine (NAMENDA) 10 mg tablet Take 10 mg by mouth daily.  OLANZapine (ZyPREXA) 2.5 mg tablet Take 2.5 mg by mouth two (2) times a day.  OLANZapine (ZyPREXA) 10 mg tablet Take 10 mg by mouth nightly.  traZODone (DESYREL) 50 mg tablet Take  by mouth nightly. Past History     Past Medical History:  Past Medical History:   Diagnosis Date    Dementia ([address]     Psychiatric disorder        Past Surgical History:  No past surgical history on file. Family History:  No family history on file. Social History:  Social History     Tobacco Use    Smoking status: Never Smoker   Substance Use Topics    Alcohol use: Not Currently    Drug use: Not Currently       Allergies:  No Known Allergies      Review of Systems       Review of Systems   Constitutional: Negative for chills and fever.    HENT: Negative for nasal congestion, sore throat, rhinorrhea  Eyes: Negative. Respiratory: Negative for cough and negative for shortness of breath. Cardiovascular: Negative for chest pain and palpitations. Gastrointestinal: Negative for abdominal pain, constipation, diarrhea, nausea and vomiting. Genitourinary: Negative. Negative for difficulty urinating and flank pain. Musculoskeletal: Negative for back pain. Negative for gait problem and neck pain. Skin: Negative. Allergic/Immunologic: Negative. Neurological: Negative for dizziness, weakness, numbness and headaches. Psychiatric/Behavioral: Negative. All other systems reviewed and are negative. All Other Systems Negative  Physical Exam     Vitals:    02/05/21 1415   BP: 138/67   Pulse: 71   Resp: 18   Temp: 98.2 °F (36.8 °C)   SpO2: 97%     Physical Exam  Vitals signs and nursing note reviewed. Constitutional:       General: She is not in acute distress. Appearance: She is well-developed. She is not diaphoretic. Comments: NAD, well hydrated, non toxic     HENT:      Head: Normocephalic and atraumatic. Nose: Nose normal.      Mouth/Throat:      Pharynx: No oropharyngeal exudate. Eyes:      Conjunctiva/sclera: Conjunctivae normal.      Pupils: Pupils are equal, round, and reactive to light. Neck:      Musculoskeletal: Normal range of motion and neck supple. No neck rigidity, spinous process tenderness or muscular tenderness. Vascular: No carotid bruit. Comments: Pt in C collar, c-spine removed using   Cardiovascular:      Rate and Rhythm: Normal rate and regular rhythm. Heart sounds: Normal heart sounds. No murmur. Pulmonary:      Effort: Pulmonary effort is normal. No respiratory distress. Breath sounds: Normal breath sounds. No wheezing or rales. Abdominal:      General: There is no distension. Palpations: Abdomen is soft. Tenderness: There is no abdominal tenderness. There is no guarding.    Musculoskeletal: Normal range of motion. Lymphadenopathy:      Cervical: No cervical adenopathy. Skin:     General: Skin is warm. Findings: No rash. Neurological:      Mental Status: She is alert. Mental status is at baseline. Cranial Nerves: No cranial nerve deficit. Coordination: Coordination normal.   Psychiatric:         Behavior: Behavior normal.           Diagnostic Study Results     Labs -   No results found for this or any previous visit (from the past 12 hour(s)). Radiologic Studies -   CT HEAD WO CONT    (Results Pending)   CT SPINE CERV WO CONT    (Results Pending)     CT Results  (Last 48 hours)    None        CXR Results  (Last 48 hours)    None            Medical Decision Making   I am the first provider for this patient. I reviewed the vital signs, available nursing notes, past medical history, past surgical history, family history and social history. Vital Signs-Reviewed the patient's vital signs. Records Reviewed: Nursing notes, old medical records and any previous labs, imaging, visits, consultations pertinent to patient care    Procedures:  Procedures      ED Course: Progress Notes, Reevaluation, and Consults:      Provider Notes (Medical Decision Making):   CT of head and brain within normal limits. CT of C-spine also within normal limits. C-collar is removed. I called patient's  I will you agrees that this is patient's baseline. He will follow up with her at the [facility]. At this time we are waiting for transportation. MED RECONCILIATION:  No current facility-administered medications for this encounter. Current Outpatient Medications   Medication Sig    rivastigmine (EXELON) 9.5 mg/24 hr patch 1 Patch by TransDERmal route daily. Dose 13.3 mg/24 hr not found in system    acetaminophen (TYLENOL) 325 mg tablet Take 325 mg by mouth every four (4) hours as needed for Pain.  melatonin 3 mg tablet Take 3 mg by mouth nightly.     metroNIDAZOLE (METROGEL) 0.75 % topical gel Apply 1 g to affected area two (2) times a day.  memantine (NAMENDA) 10 mg tablet Take 10 mg by mouth daily.  OLANZapine (ZyPREXA) 2.5 mg tablet Take 2.5 mg by mouth two (2) times a day.  OLANZapine (ZyPREXA) 10 mg tablet Take 10 mg by mouth nightly.  traZODone (DESYREL) 50 mg tablet Take  by mouth nightly. Disposition:  home    DISCHARGE NOTE:     Pt has been reexamined. Patient has no new complaints, changes, or physical findings. Care plan outlined and precautions discussed. Discussed proper way to take medications. Discussed treatment plan, return precautions, symptomatic relief, and expected time to improvement. All questions answered. Patient is stable for discharge and outpatient management. Patient is ready to go home. Follow-up Information    None         Current Discharge Medication List                Diagnosis     Clinical Impression:   1. Fall, initial encounter            Dictation disclaimer:  Please note that this dictation was completed with [employer], the computer voice recognition software. Quite often unanticipated grammatical, syntax, homophones, and other interpretive errors are inadvertently transcribed by the computer software. Please disregard these errors. Please excuse any errors that have escaped final proofreading.

## 2021-02-05 NOTE — ED NOTES
Pt resting in stretcher, no acute distress noted, awaiting [facility] transport for DC back to [facility]

## 2021-02-17 ENCOUNTER — HOSPITAL ENCOUNTER (EMERGENCY)
Age: 71
Discharge: HOME | End: 2021-02-17
Attending: EMERGENCY MEDICINE
Payer: COMMERCIAL

## 2021-02-17 ENCOUNTER — APPOINTMENT (OUTPATIENT)
Dept: CT IMAGING | Age: 71
End: 2021-02-17
Attending: PHYSICIAN ASSISTANT
Payer: COMMERCIAL

## 2021-02-17 VITALS
HEIGHT: 66 IN | TEMPERATURE: 98.4 F | WEIGHT: 122.6 LBS | DIASTOLIC BLOOD PRESSURE: 66 MMHG | HEART RATE: 78 BPM | SYSTOLIC BLOOD PRESSURE: 115 MMHG | RESPIRATION RATE: 12 BRPM | BODY MASS INDEX: 19.7 KG/M2 | OXYGEN SATURATION: 97 %

## 2021-02-17 DIAGNOSIS — S09.90XA CLOSED HEAD INJURY, INITIAL ENCOUNTER: ICD-10-CM
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
## 2021-02-17 DIAGNOSIS — F03.C0 ADVANCED DEMENTIA: ICD-10-CM

## 2021-02-17 DIAGNOSIS — T14.8XXA ABRASION: ICD-10-CM

## 2021-02-17 PROCEDURE — 72125 CT NECK SPINE W/O DYE: CPT

## 2021-02-17 PROCEDURE — 70450 CT HEAD/BRAIN W/O DYE: CPT

## 2021-02-17 PROCEDURE — 99285 EMERGENCY DEPT VISIT HI MDM: CPT

## 2021-02-17 PROCEDURE — 74011250637 HC RX REV CODE- 250/637: Performed by: PHYSICIAN ASSISTANT

## 2021-02-17 RX ORDER — ACETAMINOPHEN 500 MG
1000 TABLET ORAL
Status: COMPLETED | OUTPATIENT
Start: 2021-02-17 | End: 2021-02-17

## 2021-02-17 RX ADMIN — ACETAMINOPHEN 1000 MG: 500 TABLET ORAL at 20:00

## 2021-02-18 NOTE — ED NOTES
Calling [facility] to give report. Awaiting for patient to be picked up and transported back to [facility]. 2125  Was on hold for twenty minutes, hung up and called back again and this time there was no answer. (the first time someone answered and transferred me to a pod, but no one picked up).

## 2021-02-18 NOTE — ED PROVIDER NOTES
EMERGENCY DEPARTMENT HISTORY AND PHYSICAL EXAM    Date: 2/17/2021  Patient Name: Joanna Altman    History of Presenting Illness     Chief Complaint   Patient presents with    Fall         History Provided By: EMS    Chief Complaint: Fall, head injury  Duration: Moderate   Timing: Acute  Location: Posterior scalp  Quality: Bleeding  Severity: Moderate  Modifying Factors: Worse after falling  Associated Symptoms: none       Additional History (Context): Joanna Altman is a 66 y.o. female with a history of dementia and anxiety who presents today for issues listed above. The majority of the history is provided by EMS. Per EMS the nursing facility states she is at her baseline. Reports she had an unwitnessed fall. States tetanus is up-to-date. States she falls frequently. Did not give her any medication prior to arrival.  Is not on any blood thinners. PCP: Luis Alberto Rodriguez MD    Current Outpatient Medications   Medication Sig Dispense Refill    rivastigmine (EXELON) 9.5 mg/24 hr patch 1 Patch by TransDERmal route daily. Dose 13.3 mg/24 hr not found in system      acetaminophen (TYLENOL) 325 mg tablet Take 325 mg by mouth every four (4) hours as needed for Pain.  melatonin 3 mg tablet Take 3 mg by mouth nightly.  metroNIDAZOLE (METROGEL) 0.75 % topical gel Apply 1 g to affected area two (2) times a day.  memantine (NAMENDA) 10 mg tablet Take 10 mg by mouth daily.  OLANZapine (ZyPREXA) 2.5 mg tablet Take 2.5 mg by mouth two (2) times a day.  OLANZapine (ZyPREXA) 10 mg tablet Take 10 mg by mouth nightly.  traZODone (DESYREL) 50 mg tablet Take  by mouth nightly. Past History     Past Medical History:  Past Medical History:   Diagnosis Date    Dementia ([facility] [address]     Psychiatric disorder        Past Surgical History:  No past surgical history on file. Family History:  No family history on file.     Social History:  Social History     Tobacco Use    Smoking status: Never Smoker Substance Use Topics    Alcohol use: Not Currently    Drug use: Not Currently       Allergies:  No Known Allergies      Review of Systems   Review of Systems   Unable to perform ROS: Dementia     All Other Systems Negative  Physical Exam     Vitals:    02/17/21 1935   BP: 121/79   Pulse: 93   Resp: 18   Temp: 98.4 °F (36.9 °C)   SpO2: 97%   Weight: 55.6 kg (122 lb 9.6 oz)   Height: 5' 6\" (1.676 m)     Physical Exam  Vitals signs and nursing note reviewed. Constitutional:       General: She is not in acute distress. Appearance: She is well-developed. She is not diaphoretic. Comments: Pleasantly confused   HENT:      Head: Normocephalic. Abrasion present. No raccoon eyes or Call's sign. Eyes:      Conjunctiva/sclera: Conjunctivae normal.      Pupils: Pupils are equal, round, and reactive to light. Neck:      Musculoskeletal: Normal range of motion and neck supple. Cardiovascular:      Rate and Rhythm: Normal rate and regular rhythm. Heart sounds: Normal heart sounds. Pulmonary:      Effort: Pulmonary effort is normal. No respiratory distress. Breath sounds: Normal breath sounds. Chest:      Chest wall: No tenderness. Abdominal:      General: Bowel sounds are normal. There is no distension. Palpations: Abdomen is soft. Tenderness: There is no abdominal tenderness. There is no guarding or rebound. Musculoskeletal:         General: No deformity. Skin:     General: Skin is warm and dry. Findings: Abrasion present. Comments: Small abrasion noted to posterior scalp   Neurological:      General: No focal deficit present. Mental Status: She is alert and oriented to person, place, and time. Cranial Nerves: No facial asymmetry. Motor: No weakness. Deep Tendon Reflexes: Reflexes are normal and symmetric. Comments: Moving all extremities purposefully without hesitation or weakness. No facial droop.   Neuro exam is limited secondary to patient's dementia           Diagnostic Study Results     Labs -   No results found for this or any previous visit (from the past 12 hour(s)). Radiologic Studies -   CT HEAD WO CONT    (Results Pending)   CT SPINE CERV WO CONT    (Results Pending)     CT Results  (Last 48 hours)    None        CXR Results  (Last 48 hours)    None            Medical Decision Making   I am the first provider for this patient. I reviewed the vital signs, available nursing notes, past medical history, past surgical history, family history and social history. Vital Signs-Reviewed the patient's vital signs. Records Reviewed: Nursing Notes and Old Medical Records     Procedures: None   Procedures    Provider Notes (Medical Decision Making):     Differential: fracture, dislocation, abrasion, sprain, contusion, laceration, closed head injury, intracranial bleed      Plan: Will order CT of head and neck. Will order dose of Tylenol. Have discussed plan with Dr. Alejandra Madison. 8:32 PM  Imaging was unremarkable. Will discharge home. MED RECONCILIATION:  No current facility-administered medications for this encounter. Current Outpatient Medications   Medication Sig    rivastigmine (EXELON) 9.5 mg/24 hr patch 1 Patch by TransDERmal route daily. Dose 13.3 mg/24 hr not found in system    acetaminophen (TYLENOL) 325 mg tablet Take 325 mg by mouth every four (4) hours as needed for Pain.  melatonin 3 mg tablet Take 3 mg by mouth nightly.  metroNIDAZOLE (METROGEL) 0.75 % topical gel Apply 1 g to affected area two (2) times a day.  memantine (NAMENDA) 10 mg tablet Take 10 mg by mouth daily.  OLANZapine (ZyPREXA) 2.5 mg tablet Take 2.5 mg by mouth two (2) times a day.  OLANZapine (ZyPREXA) 10 mg tablet Take 10 mg by mouth nightly.  traZODone (DESYREL) 50 mg tablet Take  by mouth nightly. Disposition:  Home     DISCHARGE NOTE:   Pt has been reexamined.  Patient has no new complaints, changes, or physical findings. Care plan outlined and precautions discussed. Results of workup were reviewed with the patient. All medications were reviewed with the patient. All of pt's questions and concerns were addressed. Patient was instructed and agrees to follow up with PCP as well as to return to the ED upon further deterioration. Patient is ready to go home. Follow-up Information     Follow up With Specialties Details Why [address] [facility] EMERGENCY DEPT Emergency Medicine  As needed [address] [facility] [address]    Awilda Valiente MD Internal Medicine Schedule an appointment as soon as possible for a visit   [address]  [address] [facility]  [phone]            Current Discharge Medication List              Diagnosis     Clinical Impression:   1. Fall, initial encounter    2. Closed head injury, initial encounter    3. Abrasion    4. Advanced dementia ([address]          \"Please note that this dictation was completed with [employer], the computer voice recognition software. Quite often unanticipated grammatical, syntax, homophones, and other interpretive errors are inadvertently transcribed by the computer software. Please disregard these errors. Please excuse any errors that have escaped final proofreading. \"

## 2021-02-18 NOTE — ED TRIAGE NOTES
Had an unwitnessed fall [place], on a rail in the hallway. Back of head has an abrasion. Patient is tearful, moans and makes noises. Told by EMS that they asked staff if her current actions and moaning are baseline and they said yes.

## 2021-02-18 NOTE — ED NOTES
[facility]  has been provided discharge instructions. Allowed time for questions and clarification. Patient has been asleep.  Sent discharge instructions with transport, as well as CT reports

## 2021-02-18 NOTE — ED NOTES
[facility] is here to take patient back to [place]. Still no answer to phone.   states he will call over there, as well, to make them aware